# Patient Record
Sex: MALE | Race: WHITE
[De-identification: names, ages, dates, MRNs, and addresses within clinical notes are randomized per-mention and may not be internally consistent; named-entity substitution may affect disease eponyms.]

---

## 2019-07-10 ENCOUNTER — HOSPITAL ENCOUNTER (EMERGENCY)
Dept: HOSPITAL 8 - ED | Age: 49
Discharge: HOME | End: 2019-07-10
Payer: MEDICAID

## 2019-07-10 VITALS — DIASTOLIC BLOOD PRESSURE: 99 MMHG | SYSTOLIC BLOOD PRESSURE: 161 MMHG

## 2019-07-10 VITALS — HEIGHT: 74 IN | BODY MASS INDEX: 34.77 KG/M2 | WEIGHT: 270.95 LBS

## 2019-07-10 DIAGNOSIS — S71.111D: Primary | ICD-10-CM
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
## 2019-07-10 PROCEDURE — 99283 EMERGENCY DEPT VISIT LOW MDM: CPT

## 2019-07-10 NOTE — NUR
PT HERE FOR WOUND CARE. PT HAD LARGE LEFT THIGH ABCESS. PT HAD I/D AND HAS 
REMOVED STAPLES. PT HAD STAPELS REMOVED BY BLUE [employer].

## 2020-11-10 ENCOUNTER — HOSPITAL ENCOUNTER (EMERGENCY)
Dept: HOSPITAL 8 - ED | Age: 50
Discharge: LEFT BEFORE DISCHARGE | End: 2020-11-10
Payer: MEDICAID

## 2020-11-10 VITALS — DIASTOLIC BLOOD PRESSURE: 92 MMHG | SYSTOLIC BLOOD PRESSURE: 146 MMHG

## 2020-11-10 VITALS — HEIGHT: 74 IN | WEIGHT: 255.74 LBS | BODY MASS INDEX: 32.82 KG/M2

## 2020-11-10 DIAGNOSIS — R22.0: Primary | ICD-10-CM

## 2020-11-10 PROCEDURE — 99281 EMR DPT VST MAYX REQ PHY/QHP: CPT
